# Patient Record
(demographics unavailable — no encounter records)

---

## 2024-12-27 NOTE — DISCUSSION/SUMMARY
[FreeTextEntry1] : S/P Facial Feminization Surgery on 12/10/2024 ([facility]) Happy with results left upper eyelid sluggish to open-maybe CN III swelling  Using artificial tears Incisions are intact Hairline with dissolvable sutures and eschars some chin numbness Advised to sleep with head at 45 degrees to reduce swelling; Tolerating a soft diet; Advance to regular diet; Resume normal activities;  Stop any narcotics; Take Motrin 600mg with food every 6 hours; Continue with Jawbra at nighttime for 6 weeks Start nasal saline spray BID for 2 weeks and Afrin spray BID for 3 days Patient was given written instructions for care and follow up visit. We discussed the instructions and the patient confirmed an understanding of them. Follow up in 2 weeks

## 2024-12-27 NOTE — DISCUSSION/SUMMARY
[FreeTextEntry1] : S/P Facial Feminization Surgery on 12/10/24 Some eye discomfort Swelling and eccymosis are still present but improving; Incisions are intact; Hairline with dissolvable sutures and eschars some chin numbness Advised to sleep with head at 45 degrees to reduce swelling; Tolerating a soft diet; Advance to regular diet; Resume normal activities;  Stop any narcotics; Take Motrin 600mg with food every 6 hours; Continue with Jawbra at nighttime for 6 weeks Start nasal saline spray BID for 2 weeks and Afrin spray BID for 3 days Follow up in one week.

## 2025-01-10 NOTE — DISCUSSION/SUMMARY
[FreeTextEntry1] : S/P Facial Feminization Surgery on 12/10/2024 ([facility]) Happy with results  left upper eyelid sluggish to open-maybe CN III swelling, but improving since last visit. Using artificial tears twice daily   Incisions are intact. Removed one non dissolvable suture at base of nose, performed debridement with forceps at hairline. Applied bacitracin at time of appointment, pt to do the same to these open areas for the next 7 days, then return to usual cleansing and open to air. Hairline with dissolvable sutures and eschars some chin numbness  Advised to sleep with head at 45 degrees to reduce swelling; Tolerating a soft diet; Advance to regular diet; Resume normal activities; Stop any narcotics; Take Motrin 600mg as needed for pain Continue with Jawbra at nighttime for 6 weeks Start nasal saline spray BID for 2 weeks and Afrin spray BID for 3 days Patient was given written instructions for care and follow up visit. We discussed the instructions and the patient confirmed an understanding of them. Follow up in 6 weeks.

## 2025-01-30 NOTE — HISTORY OF PRESENT ILLNESS
[FreeTextEntry1] : 60yo transgender female assigned male at birth (Marlin, she/her) presenting for discussion on breast augmentation revision. She initally underwent breast augmentation in 1994 with Dr. Alvarado in [place]. She eventually developed an infection and contracture of the implants and had them removed in 2006. She currently doesn't have implants in place and indicates that it is "just a lot of skin." She wishes to have the appearance of breasts again. She has been on feminizing hormones since 1989. She has not noticed any additional breast growth in the last several months. She denies any history of silicone injections to the breasts. She denies any lumps, bumps, or other recent changes in her breasts. She has underwent regular breast mammograms, most recently a few months ago. She denies any family history of breast, ovarian, or pancreatic cancer. She denies any family or personal history of blood clots. She states that nipple sensation is not important to her (1/5 importance). She has no plans to breast or chest feed.  Patient is on social security benefits and disability related to mental health. Lives alone but has [employer] that will help her after surgery. Endorses marijuana use. The patient agreed to avoid all marijuana smoking for 6 weeks before surgery and 6 weeks after surgery and to avoid all THC use for 2 weeks before and after surgery. Denies tobacco use, drinks some alcohol, and denies any other recreational drug use. Patient denies any history of psychiatric hospitalization or ER admission in the past two years, most recently 1999.

## 2025-01-30 NOTE — REVIEW OF SYSTEMS
[Patient Intake Form Reviewed] : Patient intake form was reviewed [Prior Silicone Injections] : prior silicone injections [Negative] : Hematologic [MED-ROS-Integum-FT] : not breast

## 2025-01-30 NOTE — ASSESSMENT
[FreeTextEntry1] : We recommend this patient undergo a mastopexy followed by a staged breast augmentation with implant placement. We could also proceed directly with the bilateral breast augmentation with implants but given her desired goals it would be recommended to complete the mastopexy first.  If she is interested in proceeding, we will need 2 letters of assessment and another appointment for review of the patient decision checklist and bra sizers. We will also need a copy of her most recent mammogram. We will need a copy of her recent VL and CD4 count.  I, Dr. Walton, personally performed the evaluation and management (E/M) services for this new patient. That E/M includes conducting the clinically appropriate initial history &/or exam, assessing all conditions, and establishing the plan of care. Today, my HILTON, Javier Trejo PA-C, was here to observe my evaluation and management service for this patient & follow plan of care established by me going forward.

## 2025-01-30 NOTE — PHYSICAL EXAM
[de-identified] : NAD. BMI 35.8 [de-identified] : Breast exam was performed with a medical assistant chaperone present (). Well healed ebony-areolar incisions and bilateral drain sites. No dominant masses, skin changes, nipple retraction, or palpable axillary lymphadenopathy. SN->N distance is 32.5 cm on the left and 29 cm on the right; width is 15 cm (16 cm) on the left and 15 cm (16 cm) on the right; N->IMF is 11 cm on the left and 9 cm on the right. There is bilateral grade 3 ptosis. [de-identified] : Normal respiratory effort.

## 2025-01-30 NOTE — PHYSICAL EXAM
[de-identified] : NAD. BMI 35.8 [de-identified] : Breast exam was performed with a medical assistant chaperone present (). Well healed ebony-areolar incisions and bilateral drain sites. No dominant masses, skin changes, nipple retraction, or palpable axillary lymphadenopathy. SN->N distance is 32.5 cm on the left and 29 cm on the right; width is 15 cm (16 cm) on the left and 15 cm (16 cm) on the right; N->IMF is 11 cm on the left and 9 cm on the right. There is bilateral grade 3 ptosis. [de-identified] : Normal respiratory effort.

## 2025-03-30 NOTE — DISCUSSION/SUMMARY
[FreeTextEntry1] : S/P Facial Feminization Surgery on 12/10/2024 ([facility]) Happy with results  left upper eyelid greatly improved; initially sluggish to open-maybe CN III swelling. Now, only slight ptosis at rest, when compared to right eye. Using artificial tears twice daily. Refilled prescription   Incisions are intact. Hairline incision healing well, all sutures dissolved some chin numbness  Nasal bridge has bony prominence at superior aspect. No crepitus, skin is intact, not mobile. No pain but some tenderness with palpation. Follow up with Dr. Del Valle, but do not manipulate until we see her for f/u appt.  Advised to sleep with head at 45 degrees to reduce swelling; Tolerating a soft diet; Advance to regular diet; Resume normal activities; Stop any narcotics; patient has d/c'd all pain medication at this time Continue with Jawbra at nighttime for 6 weeks Start nasal saline spray BID for 2 weeks and Afrin spray BID for 3 days Patient was given written instructions for care and follow up visit. We discussed the instructions and the patient confirmed an understanding of them. Follow up in 6 weeks.

## 2025-05-12 NOTE — HISTORY OF PRESENT ILLNESS
[FreeTextEntry1] : QIAN is a 62 year old transgender female patient that presents to the office today for a post operative evaluation s/p Facial feminization surgery with: "Frontal sinus setback, Bilateral orbital reconstruction, Brow lift hairline lowering, Bilateral supraorbital contouring, Osseous genioplasty, narrowing, shortening, advancement, Mandibular contouring, Mandibular inferior border angle resection, Open rhinoplasty procedure, Submucous resection of septum, Cartilage graft for bilateral  grafts, septal extension graft, tip graft stacked, Platysmaplasty with submental fat excision, Tracheal shave, Fat grafting from abdomen to nasolabial fold and lateral cheek x25 cc, Upper lip augmentation, Cheek implants bilaterally, Buccal fat excision, Silicone removal of chin" on 12/10/2024. Pt states that she can see a stitch at the right corner of her right eye. Pt also mentions that she has a bump at the top of her nose. Pt also states that she noticed her hair has been thinning.

## 2025-05-12 NOTE — PHYSICAL EXAM
[de-identified] : Alert, calm, cooperative.  [de-identified] : Coronal incision with no signs of wound dehiscence or fluctuance. Mild edema noted, but improving. [de-identified] : Minimal right lateral canthopexy suture exposure. [de-identified] : Nasal, oral and upper lip incisions with no signs of wound dehiscence or fluctuance. Mild edema noted, but improving; +Dorsal onlay graft prominence at nasal bridge. [de-identified] : Submental incision with no signs of wound dehiscence or fluctuance. Mild edema noted, but improving. [de-identified] : Respirations even and unlabored.

## 2025-05-12 NOTE — DISCUSSION/SUMMARY
[FreeTextEntry1] : QIAN is a 62 year old transgender female patient that presents to the office today for a post operative evaluation s/p Facial feminization surgery on 12/10/2024. Instructions reviewed and questions/concerns answered.  - You may return to preferred sleeping position. - Recommended Dr. Ketan Godinez for hair grafting. - Will let post op healing continue and then plan for possible revision of dorsal onlay graft and right lateral canthopexy if still necessary.  - Wear jaw bra at night time as needed. - You may return to normal shower routine. - You may apply sunscreen to incision sites when exposed to sunlight. - You may advance diet as tolerated.  - Use saline nasal spray twice a day. - Contact us for any questions or concerns. - Follow up in 3 months.  Patient seen in conjunction with Dr. Del Valle. Patient seen and examined by me, Dr. Geovany Del Valle, personally. Treatment plan reviewed with patient by me. Physician extender was present for assistance only. I attest that the note reflects the visit and our care. Spend 35 minutes with patient and family discussing the diagnosis and treatment plan.  Patient and family informed of the timing and risks moving forward. All patient questions were answered.

## 2025-05-12 NOTE — PHYSICAL EXAM
[de-identified] : Alert, calm, cooperative.  [de-identified] : Coronal incision with no signs of wound dehiscence or fluctuance. Mild edema noted, but improving. [de-identified] : Minimal right lateral canthopexy suture exposure. [de-identified] : Nasal, oral and upper lip incisions with no signs of wound dehiscence or fluctuance. Mild edema noted, but improving; +Dorsal onlay graft prominence at nasal bridge. [de-identified] : Submental incision with no signs of wound dehiscence or fluctuance. Mild edema noted, but improving. [de-identified] : Respirations even and unlabored.